# Patient Record
Sex: FEMALE | Race: WHITE | NOT HISPANIC OR LATINO | Employment: OTHER | URBAN - METROPOLITAN AREA
[De-identification: names, ages, dates, MRNs, and addresses within clinical notes are randomized per-mention and may not be internally consistent; named-entity substitution may affect disease eponyms.]

---

## 2018-04-13 ENCOUNTER — HISTORICAL (OUTPATIENT)
Dept: ADMINISTRATIVE | Facility: HOSPITAL | Age: 63
End: 2018-04-13

## 2018-04-13 LAB
ALBUMIN SERPL-MCNC: 4.3 GM/DL (ref 3.4–5)
ALBUMIN/GLOB SERPL: 1.3 {RATIO} (ref 1.5–2.5)
ALP SERPL-CCNC: 49 UNIT/L (ref 38–126)
ALT SERPL-CCNC: 10 UNIT/L (ref 7–52)
AST SERPL-CCNC: 15 UNIT/L (ref 15–37)
BILIRUB SERPL-MCNC: 0.5 MG/DL (ref 0.2–1)
BILIRUBIN DIRECT+TOT PNL SERPL-MCNC: 0.1 MG/DL (ref 0–0.5)
BUN SERPL-MCNC: 11 MG/DL (ref 7–18)
CALCIUM SERPL-MCNC: 9.3 MG/DL (ref 8.5–10)
CHLORIDE SERPL-SCNC: 105 MMOL/L (ref 98–107)
CHOLEST SERPL-MCNC: 168 MG/DL (ref 0–200)
CHOLEST/HDLC SERPL: 6 {RATIO}
CO2 SERPL-SCNC: 28 MMOL/L (ref 21–32)
CREAT SERPL-MCNC: 0.73 MG/DL (ref 0.6–1.3)
CREAT/UREA NIT SERPL: 15.1
GGT SERPL-CCNC: 15 UNIT/L (ref 5–85)
GLOBULIN SER-MCNC: 3.3 GM/DL (ref 1.2–3)
GLUCOSE SERPL-MCNC: 108 MG/DL (ref 74–106)
HDLC SERPL-MCNC: 28 MG/DL (ref 35–60)
LDH SERPL-CCNC: 185 UNIT/L (ref 140–271)
LDLC SERPL CALC-MCNC: 107 MG/DL (ref 0–129)
POTASSIUM SERPL-SCNC: 4.4 MMOL/L (ref 3.5–5.1)
PROT SERPL-MCNC: 7.6 GM/DL (ref 6.4–8.2)
SODIUM SERPL-SCNC: 140 MMOL/L (ref 136–145)
TRIGL SERPL-MCNC: 159 MG/DL (ref 30–150)
VLDLC SERPL CALC-MCNC: 31.8 MG/DL

## 2018-10-15 ENCOUNTER — HISTORICAL (OUTPATIENT)
Dept: ADMINISTRATIVE | Facility: HOSPITAL | Age: 63
End: 2018-10-15

## 2018-10-15 LAB
ALBUMIN SERPL-MCNC: 4.3 GM/DL (ref 3.4–5)
ALBUMIN/GLOB SERPL: 1.43 {RATIO} (ref 1.5–2.5)
ALP SERPL-CCNC: 48 UNIT/L (ref 38–126)
ALT SERPL-CCNC: 12 UNIT/L (ref 7–52)
AST SERPL-CCNC: 17 UNIT/L (ref 15–37)
BILIRUB SERPL-MCNC: 0.5 MG/DL (ref 0.2–1)
BILIRUBIN DIRECT+TOT PNL SERPL-MCNC: 0 MG/DL (ref 0–0.5)
BILIRUBIN DIRECT+TOT PNL SERPL-MCNC: 0.5 MG/DL
BUN SERPL-MCNC: 9 MG/DL (ref 7–18)
CALCIUM SERPL-MCNC: 9.1 MG/DL (ref 8.5–10)
CHLORIDE SERPL-SCNC: 108 MMOL/L (ref 98–107)
CHOLEST SERPL-MCNC: 145 MG/DL (ref 0–200)
CHOLEST/HDLC SERPL: 4.8 {RATIO}
CO2 SERPL-SCNC: 31 MMOL/L (ref 21–32)
CREAT SERPL-MCNC: 0.72 MG/DL (ref 0.6–1.3)
GLOBULIN SER-MCNC: 3 GM/DL (ref 1.2–3)
GLUCOSE SERPL-MCNC: 111 MG/DL (ref 74–106)
HDLC SERPL-MCNC: 30 MG/DL (ref 35–60)
LDLC SERPL CALC-MCNC: 91 MG/DL (ref 0–129)
POTASSIUM SERPL-SCNC: 4.1 MMOL/L (ref 3.5–5.1)
PROT SERPL-MCNC: 7.3 GM/DL (ref 6.4–8.2)
SODIUM SERPL-SCNC: 141 MMOL/L (ref 136–145)
TRIGL SERPL-MCNC: 127 MG/DL (ref 30–150)
VLDLC SERPL CALC-MCNC: 25.4 MG/DL

## 2018-12-17 ENCOUNTER — HISTORICAL (OUTPATIENT)
Dept: ADMINISTRATIVE | Facility: HOSPITAL | Age: 63
End: 2018-12-17

## 2019-04-25 ENCOUNTER — HISTORICAL (OUTPATIENT)
Dept: ADMINISTRATIVE | Facility: HOSPITAL | Age: 64
End: 2019-04-25

## 2019-04-25 LAB
ALBUMIN SERPL-MCNC: 4.1 GM/DL (ref 3.4–5)
ALBUMIN/GLOB SERPL: 1.52 {RATIO} (ref 1.5–2.5)
ALP SERPL-CCNC: 42 UNIT/L (ref 38–126)
ALT SERPL-CCNC: 13 UNIT/L (ref 7–52)
AST SERPL-CCNC: 18 UNIT/L (ref 15–37)
BILIRUB SERPL-MCNC: 0.4 MG/DL (ref 0.2–1)
BILIRUBIN DIRECT+TOT PNL SERPL-MCNC: 0.1 MG/DL (ref 0–0.5)
BILIRUBIN DIRECT+TOT PNL SERPL-MCNC: 0.3 MG/DL
BUN SERPL-MCNC: 12 MG/DL (ref 7–18)
CALCIUM SERPL-MCNC: 9 MG/DL (ref 8.5–10)
CHLORIDE SERPL-SCNC: 106 MMOL/L (ref 98–107)
CHOLEST SERPL-MCNC: 127 MG/DL (ref 0–200)
CHOLEST/HDLC SERPL: 4.7 {RATIO}
CO2 SERPL-SCNC: 27 MMOL/L (ref 21–32)
CREAT SERPL-MCNC: 0.74 MG/DL (ref 0.6–1.3)
GLOBULIN SER-MCNC: 2.7 GM/DL (ref 1.2–3)
GLUCOSE SERPL-MCNC: 119 MG/DL (ref 74–106)
HDLC SERPL-MCNC: 27 MG/DL (ref 35–60)
LDLC SERPL CALC-MCNC: 80 MG/DL (ref 0–129)
POTASSIUM SERPL-SCNC: 4.6 MMOL/L (ref 3.5–5.1)
PROT SERPL-MCNC: 6.8 GM/DL (ref 6.4–8.2)
SODIUM SERPL-SCNC: 138 MMOL/L (ref 136–145)
TRIGL SERPL-MCNC: 78 MG/DL (ref 30–150)
VLDLC SERPL CALC-MCNC: 15.6 MG/DL

## 2019-10-16 ENCOUNTER — HISTORICAL (OUTPATIENT)
Dept: ADMINISTRATIVE | Facility: HOSPITAL | Age: 64
End: 2019-10-16

## 2019-10-16 LAB
ALBUMIN SERPL-MCNC: 4.3 GM/DL (ref 3.4–5)
ALBUMIN/GLOB SERPL: 1.79 {RATIO} (ref 1.5–2.5)
ALP SERPL-CCNC: 46 UNIT/L (ref 38–126)
ALT SERPL-CCNC: 13 UNIT/L (ref 7–52)
AST SERPL-CCNC: 18 UNIT/L (ref 15–37)
BILIRUB SERPL-MCNC: 0.6 MG/DL (ref 0.2–1)
BILIRUBIN DIRECT+TOT PNL SERPL-MCNC: 0.1 MG/DL (ref 0–0.5)
BILIRUBIN DIRECT+TOT PNL SERPL-MCNC: 0.5 MG/DL
BUN SERPL-MCNC: 14 MG/DL (ref 7–18)
CALCIUM SERPL-MCNC: 9.3 MG/DL (ref 8.5–10)
CHLORIDE SERPL-SCNC: 106 MMOL/L (ref 98–107)
CHOLEST SERPL-MCNC: 140 MG/DL (ref 0–200)
CHOLEST/HDLC SERPL: 4.4 {RATIO}
CO2 SERPL-SCNC: 29 MMOL/L (ref 21–32)
CREAT SERPL-MCNC: 0.76 MG/DL (ref 0.6–1.3)
EST. AVERAGE GLUCOSE BLD GHB EST-MCNC: 123 MG/DL
GLOBULIN SER-MCNC: 2.4 GM/DL (ref 1.2–3)
GLUCOSE SERPL-MCNC: 115 MG/DL (ref 74–106)
HBA1C MFR BLD: 5.9 % (ref 4.4–6.4)
HDLC SERPL-MCNC: 32 MG/DL (ref 35–60)
LDLC SERPL CALC-MCNC: 86 MG/DL (ref 0–129)
POTASSIUM SERPL-SCNC: 4.7 MMOL/L (ref 3.5–5.1)
PROT SERPL-MCNC: 6.7 GM/DL (ref 6.4–8.2)
SODIUM SERPL-SCNC: 140 MMOL/L (ref 136–145)
TRIGL SERPL-MCNC: 117 MG/DL (ref 30–150)
VLDLC SERPL CALC-MCNC: 23.4 MG/DL

## 2019-10-17 ENCOUNTER — HISTORICAL (OUTPATIENT)
Dept: ADMINISTRATIVE | Facility: HOSPITAL | Age: 64
End: 2019-10-17

## 2019-10-17 LAB
ABS NEUT (OLG): 6.4 X10(3)/MCL (ref 2.1–9.2)
ERYTHROCYTE [DISTWIDTH] IN BLOOD BY AUTOMATED COUNT: 12.4 % (ref 11.5–17)
HCT VFR BLD AUTO: 39.9 % (ref 37–47)
HGB BLD-MCNC: 13.7 GM/DL (ref 12–16)
LYMPHOCYTES # BLD AUTO: 2.8 X10(3)/MCL (ref 0.6–3.4)
LYMPHOCYTES NFR BLD AUTO: 29.1 % (ref 13–40)
MCH RBC QN AUTO: 30.2 PG (ref 27–31.2)
MCHC RBC AUTO-ENTMCNC: 34 GM/DL (ref 32–36)
MCV RBC AUTO: 88 FL (ref 80–94)
MONOCYTES # BLD AUTO: 0.5 X10(3)/MCL (ref 0.1–1.3)
MONOCYTES NFR BLD AUTO: 5.4 % (ref 0.1–24)
NEUTROPHILS NFR BLD AUTO: 65.5 % (ref 47–80)
PLATELET # BLD AUTO: 274 X10(3)/MCL (ref 130–400)
PMV BLD AUTO: 11.4 FL (ref 9.4–12.4)
RBC # BLD AUTO: 4.53 X10(6)/MCL (ref 4.2–5.4)
TSH SERPL-ACNC: 1.25 MIU/ML (ref 0.35–4.94)
WBC # SPEC AUTO: 9.7 X10(3)/MCL (ref 4.5–11.5)

## 2020-04-17 ENCOUNTER — HISTORICAL (OUTPATIENT)
Dept: ADMINISTRATIVE | Facility: HOSPITAL | Age: 65
End: 2020-04-17

## 2020-04-17 LAB
ALBUMIN SERPL-MCNC: 4.3 GM/DL (ref 3.4–5)
ALBUMIN/GLOB SERPL: 1.43 {RATIO} (ref 1.5–2.5)
ALP SERPL-CCNC: 49 UNIT/L (ref 38–126)
ALT SERPL-CCNC: 13 UNIT/L (ref 7–52)
AST SERPL-CCNC: 16 UNIT/L (ref 15–37)
BILIRUB SERPL-MCNC: 0.6 MG/DL (ref 0.2–1)
BILIRUBIN DIRECT+TOT PNL SERPL-MCNC: 0.1 MG/DL (ref 0–0.5)
BILIRUBIN DIRECT+TOT PNL SERPL-MCNC: 0.5 MG/DL
BUN SERPL-MCNC: 10 MG/DL (ref 7–18)
CALCIUM SERPL-MCNC: 9.1 MG/DL (ref 8.5–10)
CHLORIDE SERPL-SCNC: 105 MMOL/L (ref 98–107)
CHOLEST SERPL-MCNC: 134 MG/DL (ref 0–200)
CHOLEST/HDLC SERPL: 5.4 {RATIO}
CO2 SERPL-SCNC: 29 MMOL/L (ref 21–32)
CREAT SERPL-MCNC: 0.74 MG/DL (ref 0.6–1.3)
EST. AVERAGE GLUCOSE BLD GHB EST-MCNC: 131 MG/DL
GLOBULIN SER-MCNC: 3 GM/DL (ref 1.2–3)
GLUCOSE SERPL-MCNC: 112 MG/DL (ref 74–106)
HBA1C MFR BLD: 6.2 % (ref 4.4–6.4)
HDLC SERPL-MCNC: 25 MG/DL (ref 35–60)
LDLC SERPL CALC-MCNC: 80 MG/DL (ref 0–129)
POTASSIUM SERPL-SCNC: 4.5 MMOL/L (ref 3.5–5.1)
PROT SERPL-MCNC: 7.3 GM/DL (ref 6.4–8.2)
SODIUM SERPL-SCNC: 139 MMOL/L (ref 136–145)
TRIGL SERPL-MCNC: 183 MG/DL (ref 30–150)
VLDLC SERPL CALC-MCNC: 36.6 MG/DL

## 2020-10-22 ENCOUNTER — HISTORICAL (OUTPATIENT)
Dept: ADMINISTRATIVE | Facility: HOSPITAL | Age: 65
End: 2020-10-22

## 2020-10-22 LAB
ALBUMIN SERPL-MCNC: 4.2 GM/DL (ref 3.4–5)
ALBUMIN/GLOB SERPL: 1.62 {RATIO} (ref 1.5–2.5)
ALP SERPL-CCNC: 55 UNIT/L (ref 38–126)
ALT SERPL-CCNC: 10 UNIT/L (ref 7–52)
AST SERPL-CCNC: 17 UNIT/L (ref 15–37)
BILIRUB SERPL-MCNC: 0.5 MG/DL (ref 0.2–1)
BILIRUBIN DIRECT+TOT PNL SERPL-MCNC: 0.1 MG/DL (ref 0–0.5)
BILIRUBIN DIRECT+TOT PNL SERPL-MCNC: 0.4 MG/DL
BUN SERPL-MCNC: 11 MG/DL (ref 7–18)
CALCIUM SERPL-MCNC: 9.2 MG/DL (ref 8.5–10)
CHLORIDE SERPL-SCNC: 105 MMOL/L (ref 98–107)
CHOLEST SERPL-MCNC: 140 MG/DL (ref 0–200)
CHOLEST/HDLC SERPL: 4.8 {RATIO}
CO2 SERPL-SCNC: 28 MMOL/L (ref 21–32)
CREAT SERPL-MCNC: 0.73 MG/DL (ref 0.6–1.3)
EST. AVERAGE GLUCOSE BLD GHB EST-MCNC: 126 MG/DL
GLOBULIN SER-MCNC: 2.6 GM/DL (ref 1.2–3)
GLUCOSE SERPL-MCNC: 102 MG/DL (ref 74–106)
HBA1C MFR BLD: 6 % (ref 4.4–6.4)
HDLC SERPL-MCNC: 29 MG/DL (ref 35–60)
LDLC SERPL CALC-MCNC: 97 MG/DL (ref 0–129)
POTASSIUM SERPL-SCNC: 4.7 MMOL/L (ref 3.5–5.1)
PROT SERPL-MCNC: 6.8 GM/DL (ref 6.4–8.2)
SODIUM SERPL-SCNC: 140 MMOL/L (ref 136–145)
TRIGL SERPL-MCNC: 130 MG/DL (ref 30–150)
VLDLC SERPL CALC-MCNC: 26 MG/DL

## 2020-10-23 ENCOUNTER — HISTORICAL (OUTPATIENT)
Dept: ADMINISTRATIVE | Facility: HOSPITAL | Age: 65
End: 2020-10-23

## 2020-10-23 LAB
ABS NEUT (OLG): 8.2 X10(3)/MCL (ref 2.1–9.2)
DEPRECATED CALCIDIOL+CALCIFEROL SERPL-MC: 33.6 NG/ML (ref 30–80)
ERYTHROCYTE [DISTWIDTH] IN BLOOD BY AUTOMATED COUNT: 12.5 % (ref 11.5–17)
HCT VFR BLD AUTO: 42 % (ref 37–47)
HGB BLD-MCNC: 13.9 GM/DL (ref 12–16)
LYMPHOCYTES # BLD AUTO: 3.1 X10(3)/MCL (ref 0.6–3.4)
LYMPHOCYTES NFR BLD AUTO: 25.9 % (ref 13–40)
MCH RBC QN AUTO: 29.8 PG (ref 27–31.2)
MCHC RBC AUTO-ENTMCNC: 33 GM/DL (ref 32–36)
MCV RBC AUTO: 90 FL (ref 80–94)
MONOCYTES # BLD AUTO: 0.6 X10(3)/MCL (ref 0.1–1.3)
MONOCYTES NFR BLD AUTO: 5 % (ref 0.1–24)
NEUTROPHILS NFR BLD AUTO: 69.1 % (ref 47–80)
PLATELET # BLD AUTO: 305 X10(3)/MCL (ref 130–400)
PMV BLD AUTO: 10.7 FL (ref 9.4–12.4)
RBC # BLD AUTO: 4.67 X10(6)/MCL (ref 4.2–5.4)
TSH SERPL-ACNC: 0.88 MIU/ML (ref 0.35–4.94)
VIT B12 SERPL-MCNC: 815 PG/ML (ref 213–816)
WBC # SPEC AUTO: 11.9 X10(3)/MCL (ref 4.5–11.5)

## 2020-11-03 ENCOUNTER — HISTORICAL (OUTPATIENT)
Dept: ADMINISTRATIVE | Facility: HOSPITAL | Age: 65
End: 2020-11-03

## 2020-11-03 LAB — T PALLIDUM AB SER QL: NONREACTIVE

## 2020-12-30 ENCOUNTER — HISTORICAL (OUTPATIENT)
Dept: ADMINISTRATIVE | Facility: HOSPITAL | Age: 65
End: 2020-12-30

## 2020-12-30 LAB
APPEARANCE, UA: ABNORMAL
BACTERIA #/AREA URNS AUTO: ABNORMAL /HPF
BILIRUB UR QL STRIP: NEGATIVE MG/DL
COLOR UR: YELLOW
GLUCOSE (UA): NEGATIVE MG/DL
HGB UR QL STRIP: ABNORMAL UNIT/L
KETONES UR QL STRIP: NEGATIVE MG/DL
LEUKOCYTE ESTERASE UR QL STRIP: NEGATIVE UNIT/L
NITRITE UR QL STRIP.AUTO: NEGATIVE
PH UR STRIP: 5.5 [PH]
PROT UR QL STRIP: NEGATIVE MG/DL
RBC #/AREA URNS HPF: ABNORMAL /HPF
SP GR UR STRIP: 1.02
SQUAMOUS EPITHELIAL, UA: ABNORMAL /LPF
UROBILINOGEN UR STRIP-ACNC: 0.2 MG/DL
WBC #/AREA URNS AUTO: ABNORMAL /[HPF]

## 2022-01-25 ENCOUNTER — HISTORICAL (OUTPATIENT)
Dept: ADMINISTRATIVE | Facility: HOSPITAL | Age: 67
End: 2022-01-25

## 2022-01-25 LAB
ALBUMIN SERPL-MCNC: 4.7 GM/DL (ref 3.4–5)
ALBUMIN/GLOB SERPL: 1.74 {RATIO} (ref 1.5–2.5)
ALP SERPL-CCNC: 46 UNIT/L (ref 38–126)
ALT SERPL-CCNC: 15 UNIT/L (ref 7–52)
AST SERPL-CCNC: 19 UNIT/L (ref 15–37)
BILIRUB SERPL-MCNC: 0.6 MG/DL (ref 0.2–1)
BILIRUBIN DIRECT+TOT PNL SERPL-MCNC: 0.2 MG/DL (ref 0–0.5)
BILIRUBIN DIRECT+TOT PNL SERPL-MCNC: 0.4 MG/DL
BUN SERPL-MCNC: 13 MG/DL (ref 7–18)
CALCIUM SERPL-MCNC: 9.9 MG/DL (ref 8.5–10)
CHLORIDE SERPL-SCNC: 103 MMOL/L (ref 98–107)
CHOLEST SERPL-MCNC: 140 MG/DL (ref 0–200)
CHOLEST/HDLC SERPL: 4.5 {RATIO}
CO2 SERPL-SCNC: 30 MMOL/L (ref 21–32)
CREAT SERPL-MCNC: 0.88 MG/DL (ref 0.6–1.3)
GLOBULIN SER-MCNC: 2.7 GM/DL (ref 1.2–3)
GLUCOSE SERPL-MCNC: 115 MG/DL (ref 74–106)
HDLC SERPL-MCNC: 31 MG/DL (ref 35–60)
LDLC SERPL CALC-MCNC: 79 MG/DL (ref 0–129)
POTASSIUM SERPL-SCNC: 4.3 MMOL/L (ref 3.5–5.1)
PROT SERPL-MCNC: 7.4 GM/DL (ref 6.4–8.2)
SODIUM SERPL-SCNC: 141 MMOL/L (ref 136–145)
TRIGL SERPL-MCNC: 158 MG/DL (ref 30–150)
VLDLC SERPL CALC-MCNC: 31.6 MG/DL

## 2022-04-11 ENCOUNTER — HISTORICAL (OUTPATIENT)
Dept: ADMINISTRATIVE | Facility: HOSPITAL | Age: 67
End: 2022-04-11
Payer: MEDICARE

## 2022-04-24 VITALS
DIASTOLIC BLOOD PRESSURE: 76 MMHG | SYSTOLIC BLOOD PRESSURE: 122 MMHG | BODY MASS INDEX: 25.01 KG/M2 | HEIGHT: 65 IN | WEIGHT: 150.13 LBS

## 2022-05-04 NOTE — HISTORICAL [facility]
This is a historical note converted from Edd. Formatting and pictures may have been removed.  Please reference Edd for original formatting and attached multimedia. Chief Complaint  coughing and congestion for 6 weeks  History of Present Illness  also needs refill crestor  using otc meds for CCC  Review of Systems  No new complaints except as explained HPI  ?  Physical Exam  Vitals & Measurements  HR:?76(Peripheral)? BP:?122/70?  HT:?155?cm? WT:?64.3?kg? BMI:?26.76?  slight crackles left base  otherwise looks good  Assessment/Plan  1.?Sinusitis?J32.9  ?discussed her symptoms and options at length , will try celestone and zithromax  RTC/ER precautions discussed at length  2.?Bronchitis?J40  ?at length  3.?HLD (hyperlipidemia)?E78.5  ?will recheck   Problem List/Past Medical History  Ongoing  Aortic atherosclerosis  Benign essential HTN  Depression  HLD (hyperlipidemia)  Medication management  Osteoarthritis  Osteoporosis  Sinusitis  SVT (supraventricular tachycardia)  Tobacco use  Historical  Smoker  tachycardia  Procedure/Surgical History  Appendectomy  BSO - Bilateral salpingo-oophorectomy  HYSTERECTOMY  mouth surgery   Medications  bisoprolol 5 mg oral tablet, See Instructions, 2 refills  escitalopram 10 mg oral tablet, 10 mg= 1 tab(s), Oral, Daily, 1 refills  famotidine 10 mg oral tablet, PRN  rosuvastatin 20 mg oral tablet, See Instructions, 1 refills  Vitamin D3 1000 intl units oral tablet, 1000 IntUnit= 1 tab(s), Oral, Daily  Allergies  No Known Allergies  Social History  Alcohol - Denies Alcohol Use, 08/09/2014  Never, 04/12/2018  Employment/School  Employed, 12/17/2018  Home/Environment  Lives with Spouse. Living situation: Home/Independent., 04/12/2018  Substance Abuse - Denies Substance Abuse, 08/09/2014  Never, 04/12/2018  Tobacco - Medium Risk, 08/09/2014  5-9 cigarettes (between 1/4 to 1/2 pack)/day in last 30 days, N/A, 04/17/2019  Current every day smoker, Cigarettes, 10 per day.,  08/09/2014  Family History  Alzheimers disease: Mother.  Hodgkin disease: Brother.  Leukemia: Father.  Pancreatic cancer: Mother.  Immunizations  Vaccine Date Status   influenza virus vaccine, inactivated 10/15/2018 Given   pneumococcal 23-polyvalent vaccine 10/11/2017 Recorded   tetanus/diphth/pertuss (Tdap) adult/adol 01/17/2012 Recorded   Health Maintenance  Health Maintenance  ???Pending?(in the next year)  ??? ??OverDue  ??? ? ? ?Diabetes Screening due??and every?  ??? ? ? ?Aspirin Therapy for CVD Prevention due??08/10/15??and every 1??year(s)  ??? ??Due?  ??? ? ? ?ADL Screening due??04/21/19??and every 1??year(s)  ??? ? ? ?Alcohol Misuse Screening due??04/21/19??and every 1??year(s)  ??? ? ? ?Hypertension Management-Education due??04/21/19??and every 1??year(s)  ??? ? ? ?Lung Cancer Screening due??04/21/19??and every 1??year(s)  ??? ? ? ?Smoking Cessation due??04/21/19??Variable frequency  ??? ? ? ?Zoster Vaccine due??04/21/19??and every 100??year(s)  ??? ??Due In Future?  ??? ? ? ?Hypertension Management-BMP not due until??10/15/19??and every 1??year(s)  ??? ? ? ?Blood Pressure Screening not due until??04/16/20??and every 1??year(s)  ??? ? ? ?Body Mass Index Check not due until??04/16/20??and every 1??year(s)  ??? ? ? ?Hypertension Management-Blood Pressure not due until??04/16/20??and every 1??year(s)  ??? ? ? ?Obesity Screening not due until??04/17/20??and every 1??year(s)  ???Satisfied?(in the past 1 year)  ??? ??Satisfied?  ??? ? ? ?Blood Pressure Screening on??04/17/19.??Satisfied by Marti Lu MA  ??? ? ? ?Body Mass Index Check on??04/17/19.??Satisfied by Marti Lu MA  ??? ? ? ?Breast Cancer Screening on??10/15/18.??Satisfied by Toma Turcios LPN  ??? ? ? ?Diabetes Screening on??10/15/18.??Satisfied by Jasen Gore  ??? ? ? ?Hypertension Management-Blood Pressure on??04/17/19.??Satisfied by Marti Lu MA  ??? ? ? ?Influenza Vaccine on??10/15/18.??Satisfied by Toma Turcios LPN  ???  ? ? ?Lipid Screening on??10/15/18.??Satisfied by Jasen Gore  ??? ? ? ?Obesity Screening on??04/17/19.??Satisfied by Marti Lu MA  ?  ?

## 2022-06-16 RX ORDER — LISINOPRIL 10 MG/1
10 TABLET ORAL DAILY
COMMUNITY
End: 2022-07-19

## 2022-06-16 RX ORDER — ESCITALOPRAM OXALATE 10 MG/1
20 TABLET ORAL DAILY
COMMUNITY
End: 2022-08-22 | Stop reason: SDUPTHER

## 2022-06-16 RX ORDER — FOLIC ACID/MULTIVIT,IRON,MINER 0.4MG-18MG
TABLET ORAL
COMMUNITY

## 2022-06-16 RX ORDER — BISOPROLOL FUMARATE 5 MG/1
5 TABLET, FILM COATED ORAL DAILY
COMMUNITY
Start: 2022-05-08

## 2022-06-16 RX ORDER — ROSUVASTATIN CALCIUM 20 MG/1
20 TABLET, COATED ORAL DAILY
COMMUNITY
End: 2022-08-03 | Stop reason: SDUPTHER

## 2022-06-16 RX ORDER — DONEPEZIL HYDROCHLORIDE 10 MG/1
10 TABLET, FILM COATED ORAL
COMMUNITY
Start: 2022-01-18 | End: 2022-07-25 | Stop reason: SDUPTHER

## 2022-07-19 PROBLEM — R73.9 HYPERGLYCEMIA: Status: ACTIVE | Noted: 2022-07-19

## 2022-07-19 PROBLEM — R53.83 FATIGUE: Status: ACTIVE | Noted: 2022-07-19

## 2022-07-19 PROBLEM — E78.5 HYPERLIPIDEMIA: Status: ACTIVE | Noted: 2022-07-19

## 2022-07-19 PROBLEM — I47.10 SUPRAVENTRICULAR TACHYCARDIA: Status: ACTIVE | Noted: 2022-07-19

## 2022-07-19 PROBLEM — F41.0 PANIC ATTACK: Status: ACTIVE | Noted: 2022-07-19

## 2022-07-19 PROBLEM — R31.9 HEMATURIA: Status: ACTIVE | Noted: 2022-07-19

## 2022-07-19 PROBLEM — I70.0 ATHEROSCLEROSIS OF AORTA: Status: ACTIVE | Noted: 2022-07-19

## 2022-07-19 PROBLEM — F48.2 PSEUDOBULBAR AFFECT: Status: ACTIVE | Noted: 2022-07-19

## 2022-07-19 PROBLEM — F41.9 ANXIETY: Status: ACTIVE | Noted: 2022-07-19

## 2022-07-19 PROBLEM — M19.90 OSTEOARTHRITIS: Status: ACTIVE | Noted: 2022-07-19

## 2022-07-19 PROBLEM — I10 HYPERTENSION: Status: ACTIVE | Noted: 2022-07-19

## 2022-07-19 PROBLEM — M81.0 OSTEOPOROSIS: Status: ACTIVE | Noted: 2022-07-19

## 2022-07-19 PROBLEM — F32.A DEPRESSIVE DISORDER: Status: ACTIVE | Noted: 2022-07-19

## 2022-08-08 ENCOUNTER — OFFICE VISIT (OUTPATIENT)
Dept: NEUROLOGY | Facility: CLINIC | Age: 67
End: 2022-08-08
Payer: MEDICARE

## 2022-08-08 VITALS
BODY MASS INDEX: 24.75 KG/M2 | SYSTOLIC BLOOD PRESSURE: 140 MMHG | DIASTOLIC BLOOD PRESSURE: 92 MMHG | HEIGHT: 64 IN | WEIGHT: 145 LBS

## 2022-08-08 DIAGNOSIS — F41.9 ANXIETY: ICD-10-CM

## 2022-08-08 DIAGNOSIS — F32.A DEPRESSION, UNSPECIFIED DEPRESSION TYPE: ICD-10-CM

## 2022-08-08 DIAGNOSIS — F48.2 PSEUDOBULBAR AFFECT: ICD-10-CM

## 2022-08-08 DIAGNOSIS — G31.84 MILD COGNITIVE IMPAIRMENT: Primary | ICD-10-CM

## 2022-08-08 PROCEDURE — 99999 PR PBB SHADOW E&M-EST. PATIENT-LVL IV: CPT | Mod: PBBFAC,,, | Performed by: NURSE PRACTITIONER

## 2022-08-08 PROCEDURE — 99214 OFFICE O/P EST MOD 30 MIN: CPT | Mod: PBBFAC | Performed by: NURSE PRACTITIONER

## 2022-08-08 PROCEDURE — 99999 PR PBB SHADOW E&M-EST. PATIENT-LVL IV: ICD-10-PCS | Mod: PBBFAC,,, | Performed by: NURSE PRACTITIONER

## 2022-08-08 PROCEDURE — 99214 OFFICE O/P EST MOD 30 MIN: CPT | Mod: S$PBB,,, | Performed by: NURSE PRACTITIONER

## 2022-08-08 PROCEDURE — 99214 PR OFFICE/OUTPT VISIT, EST, LEVL IV, 30-39 MIN: ICD-10-PCS | Mod: S$PBB,,, | Performed by: NURSE PRACTITIONER
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
## 2022-08-08 NOTE — ASSESSMENT & PLAN NOTE
Continue the Memantine 10 every morning and every night    I would advise she stop the Aricept - h/o insomnia and bladder dysfunction as well as recent myocardial infarction. Patient does not appreciate much benefit from medication reportedly.    Driving discussed    FU in 6 months

## 2022-08-08 NOTE — ASSESSMENT & PLAN NOTE
Considerations discussed:  1) Stop the Aricept    2) Stop the Ritalin    3) stop the Clonidine if ok with cardio    4) Transition Lexapro to Wellbutrin or Sertraline [Wellbutrin may help w/focus and energy as well as depression so it's an option in an effort to clean up her medication list as it could possibly eliminate the need for Lexapro and Ritalin]    Polypharmacy likely a contributing factor here

## 2022-08-08 NOTE — ASSESSMENT & PLAN NOTE
"Taking Xanax and Ritalin concurrently may not be ideal; Ritalin may exacerbate her anxiety.    Advise they speak to cardio regarding eliminating the Clonidine  Stop the Aricept    See above recs under "depression"      "

## 2022-08-08 NOTE — ASSESSMENT & PLAN NOTE
Discussed Nuedexta again today. They will speak w/Dr. Wiley again. I do not want to make too many medication changes at once

## 2022-10-19 PROBLEM — R94.31 ABNORMAL ELECTROCARDIOGRAPHY: Status: ACTIVE | Noted: 2022-10-19

## 2022-10-19 PROBLEM — R41.89 IMPAIRED COGNITION: Status: ACTIVE | Noted: 2022-10-19

## 2023-01-31 PROBLEM — F03.90 DEMENTIA: Status: ACTIVE | Noted: 2022-10-19

## 2023-01-31 PROBLEM — F98.8 ATTENTION DEFICIT DISORDER: Status: ACTIVE | Noted: 2023-01-31

## 2023-01-31 PROCEDURE — 86803 HEPATITIS C AB TEST: CPT | Performed by: FAMILY MEDICINE

## 2023-04-27 PROBLEM — I25.10 CORONARY ARTERY DISEASE INVOLVING NATIVE CORONARY ARTERY OF NATIVE HEART WITHOUT ANGINA PECTORIS: Status: ACTIVE | Noted: 2023-04-27

## 2023-06-15 PROBLEM — I47.10 SUPRAVENTRICULAR TACHYCARDIA: Status: RESOLVED | Noted: 2022-07-19 | Resolved: 2023-06-15

## 2023-12-28 PROBLEM — R73.01 IFG (IMPAIRED FASTING GLUCOSE): Status: ACTIVE | Noted: 2023-12-28

## 2023-12-28 PROBLEM — E55.9 VITAMIN D DEFICIENCY: Status: ACTIVE | Noted: 2023-12-28

## 2024-03-28 PROBLEM — F03.C3: Status: ACTIVE | Noted: 2022-10-19

## 2025-06-28 ENCOUNTER — HOSPITAL ENCOUNTER (EMERGENCY)
Facility: HOSPITAL | Age: 70
Discharge: HOME | End: 2025-06-28
Attending: EMERGENCY MEDICINE
Payer: MEDICARE

## 2025-06-28 VITALS
HEIGHT: 64 IN | RESPIRATION RATE: 20 BRPM | SYSTOLIC BLOOD PRESSURE: 120 MMHG | BODY MASS INDEX: 25.1 KG/M2 | TEMPERATURE: 97 F | OXYGEN SATURATION: 97 % | WEIGHT: 147 LBS | DIASTOLIC BLOOD PRESSURE: 83 MMHG | HEART RATE: 72 BPM

## 2025-06-28 DIAGNOSIS — S50.02XA CONTUSION OF LEFT ELBOW, INITIAL ENCOUNTER: ICD-10-CM

## 2025-06-28 DIAGNOSIS — S51.012A ELBOW LACERATION, LEFT, INITIAL ENCOUNTER: Primary | ICD-10-CM

## 2025-06-28 PROCEDURE — 90715 TDAP VACCINE 7 YRS/> IM: CPT | Performed by: EMERGENCY MEDICINE

## 2025-06-28 PROCEDURE — 25000003 PHARM REV CODE 250: Performed by: EMERGENCY MEDICINE

## 2025-06-28 PROCEDURE — 90471 IMMUNIZATION ADMIN: CPT | Performed by: EMERGENCY MEDICINE

## 2025-06-28 PROCEDURE — 12002 RPR S/N/AX/GEN/TRNK2.6-7.5CM: CPT

## 2025-06-28 PROCEDURE — 99283 EMERGENCY DEPT VISIT LOW MDM: CPT | Mod: 25

## 2025-06-28 PROCEDURE — 63600175 PHARM REV CODE 636 W HCPCS: Performed by: EMERGENCY MEDICINE

## 2025-06-28 RX ORDER — LORAZEPAM 1 MG/1
2 TABLET ORAL
Status: COMPLETED | OUTPATIENT
Start: 2025-06-28 | End: 2025-06-28

## 2025-06-28 RX ADMIN — LORAZEPAM 2 MG: 1 TABLET ORAL at 06:06

## 2025-06-28 RX ADMIN — CLOSTRIDIUM TETANI TOXOID ANTIGEN (FORMALDEHYDE INACTIVATED), CORYNEBACTERIUM DIPHTHERIAE TOXOID ANTIGEN (FORMALDEHYDE INACTIVATED), BORDETELLA PERTUSSIS TOXOID ANTIGEN (GLUTARALDEHYDE INACTIVATED), BORDETELLA PERTUSSIS FILAMENTOUS HEMAGGLUTININ ANTIGEN (FORMALDEHYDE INACTIVATED), BORDETELLA PERTUSSIS PERTACTIN ANTIGEN, AND BORDETELLA PERTUSSIS FIMBRIAE 2/3 ANTIGEN 0.5 ML: 5; 2; 2.5; 5; 3; 5 INJECTION, SUSPENSION INTRAMUSCULAR at 06:06

## 2025-06-28 NOTE — DISCHARGE INSTRUCTIONS
Sutures need to be removed in 7-10 days, can be done by palliative care or here in the ED.  When bathing, washing gently with soap and water, do not soak or scrub.   Pat area dry and redress when completely dry   Ice elbow area for 10-15 minutes at a time if she will tolerate

## 2025-06-28 NOTE — ED PROVIDER NOTES
"Encounter Date: 6/28/2025    SCRIBE #1 NOTE: I, Tanika Khoury, am scribing for, and in the presence of,  Mariel Brown MD. I have scribed the following portions of the note - Other sections scribed: HPI, ROS, PE.       History     Chief Complaint   Patient presents with    Fall     Here with c/o fall per .  states, "he was putting her back to bed when they fell backwards to the floor. He broke her fall, but she cut left elbow on a nightlight or oxygen bottle". Denies hitting head and unsure if she is on blood thinners. Laceration noted to left elbow with minimal bleeding in triage. Patient GCS 14 at baseline and currently on hospice. UTD on tetanus.      Patient is a 70 year old female with a history of dementia, bladder cancer, hyperglycemia, HLD, HTN, and osteoporosis presenting to the ED after falling this morning while  was putting her back in bed. Patient's  states the patient fell on top of him, but the patient hit her elbow on an oxygen tank or night stand. Patient's  reports she is not on oxygen but the oxygen was put there when they set up palliative care for her dementia. Patient's  denies the patient had a head strike.  Patient complains of left elbow pain.    The history is provided by the patient, medical records and the spouse.     Review of patient's allergies indicates:   Allergen Reactions    Pregabalin      Other reaction(s): Suicidal ideation     Past Medical History:   Diagnosis Date    Bladder cancer     Depression     Fatigue     Generalized OA     Hyperglycemia     Hyperlipidemia     Hypertension     Osteoarthritis     Osteoporosis     Panic attack     Pseudobulbar affect      Past Surgical History:   Procedure Laterality Date    APPENDECTOMY      BILATERAL SALPINGO-OOPHORECTOMY (BSO)      coronary artery dilation      HYSTERECTOMY      MOUTH SURGERY       Family History   Problem Relation Name Age of Onset    Alzheimer's disease Mother      Cancer " Mother      Cancer Father      Cancer Brother      Alzheimer's disease Maternal Grandmother       Social History[1]  Review of Systems   Musculoskeletal:         Left elbow pain       Physical Exam     Initial Vitals [06/28/25 0515]   BP Pulse Resp Temp SpO2   120/83 81 16 97.4 °F (36.3 °C) 96 %      MAP       --         Physical Exam    Nursing note and vitals reviewed.  Constitutional: She appears well-developed and well-nourished. She is not diaphoretic. She does not appear ill. No distress.   HENT:   Head: Normocephalic and atraumatic.   Right Ear: External ear normal.   Left Ear: External ear normal. Mouth/Throat: Oropharynx is clear and moist.   Eyes: Conjunctivae are normal.   Neck: Neck supple. No tracheal deviation present.   Cardiovascular:  Normal rate, regular rhythm and normal heart sounds.           No murmur heard.  Pulmonary/Chest: Breath sounds normal. No respiratory distress.   Abdominal:   No right CVA tenderness.  No left CVA tenderness.   Musculoskeletal:      Cervical back: Neck supple.      Comments: 5 cm laceration to the left elbow with mild swelling and contusion, FROM of left elbow      Neurological: She is alert. She has normal strength. GCS eye subscore is 4. GCS verbal subscore is 5. GCS motor subscore is 6.   Skin: Skin is warm and dry. Capillary refill takes less than 2 seconds. No rash noted. No pallor.               ED Course   Lac Repair    Date/Time: 6/28/2025 10:12 AM    Performed by: Mariel Brown MD  Authorized by: Mariel Brown MD    Consent:     Consent obtained:  Verbal    Consent given by:  Patient    Risks, benefits, and alternatives were discussed: yes      Risks discussed:  Infection, pain and poor cosmetic result    Alternatives discussed:  No treatment  Universal protocol:     Patient identity confirmed:  Verbally with patient and arm band  Anesthesia:     Anesthesia method:  Local infiltration    Local anesthetic:  Lidocaine 2% WITH epi  Laceration details:      Location: left elbow.    Length (cm):  5  Pre-procedure details:     Preparation:  Patient was prepped and draped in usual sterile fashion  Exploration:     Limited defect created (wound extended): no      Wound exploration: wound explored through full range of motion and entire depth of wound visualized      Wound extent: no foreign bodies/material noted, no muscle damage noted, no nerve damage noted, no tendon damage noted and no underlying fracture noted      Contaminated: no    Treatment:     Area cleansed with:  Saline    Amount of cleaning:  Standard    Irrigation solution:  Sterile saline    Irrigation method:  Syringe    Visualized foreign bodies/material removed: no      Debridement:  None    Undermining:  None    Scar revision: no    Skin repair:     Repair method:  Sutures    Suture size:  4-0    Wound skin closure material used: Ethilon.    Suture technique:  Simple interrupted    Number of sutures:  8  Approximation:     Approximation:  Close  Repair type:     Repair type:  Simple  Post-procedure details:     Dressing:  Bulky dressing    Procedure completion:  Tolerated well, no immediate complications    Labs Reviewed - No data to display       Imaging Results    None          Medications   LORazepam tablet 2 mg (2 mg Oral Given 6/28/25 0610)   Tdap vaccine injection 0.5 mL (0.5 mLs Intramuscular Given 6/28/25 0615)     Medical Decision Making  The differential diagnosis includes, but is not limited to, laceration and open joint fracture.      Given tetanus shot  Given Ativan oral, home dose   Repaired without issues   Discussed wound care     Problems Addressed:  Contusion of left elbow, initial encounter: acute illness or injury that poses a threat to life or bodily functions  Elbow laceration, left, initial encounter: acute illness or injury that poses a threat to life or bodily functions    Risk  OTC drugs.  Prescription drug management.  Minor surgery with identified risk factors.             Scribe Attestation:   Scribe #1: I performed the above scribed service and the documentation accurately describes the services I performed. I attest to the accuracy of the note.    Attending Attestation:           Physician Attestation for Scribe:  Physician Attestation Statement for Scribe #1: I, Mariel Brown MD, reviewed documentation, as scribed by Tanika Khoury in my presence, and it is both accurate and complete.                                    Clinical Impression:  Final diagnoses:  [S51.012A] Elbow laceration, left, initial encounter (Primary)  [S50.02XA] Contusion of left elbow, initial encounter          ED Disposition Condition    Discharge Stable          ED Prescriptions    None       Follow-up Information       Follow up With Specialties Details Why Contact Info    [facility] - Emergency Dept Emergency Medicine In 1 week For suture removal [address] [place] [id number]  [phone]                   [1]   Social History  Tobacco Use    Smoking status: Former    Smokeless tobacco: Never   Substance Use Topics    Alcohol use: Not Currently    Drug use: Never        Mariel Brown MD  06/28/25 1013